# Patient Record
Sex: FEMALE | Race: WHITE | NOT HISPANIC OR LATINO | Employment: FULL TIME
[De-identification: names, ages, dates, MRNs, and addresses within clinical notes are randomized per-mention and may not be internally consistent; named-entity substitution may affect disease eponyms.]

---

## 2019-08-21 ENCOUNTER — RECORDS - HEALTHEAST (OUTPATIENT)
Dept: ADMINISTRATIVE | Facility: OTHER | Age: 34
End: 2019-08-21

## 2024-07-26 ENCOUNTER — TRANSFERRED RECORDS (OUTPATIENT)
Dept: HEALTH INFORMATION MANAGEMENT | Facility: CLINIC | Age: 39
End: 2024-07-26
Payer: COMMERCIAL

## 2024-07-26 ENCOUNTER — MEDICAL CORRESPONDENCE (OUTPATIENT)
Dept: HEALTH INFORMATION MANAGEMENT | Facility: CLINIC | Age: 39
End: 2024-07-26
Payer: COMMERCIAL

## 2024-09-04 ENCOUNTER — OFFICE VISIT (OUTPATIENT)
Dept: EDUCATION SERVICES | Facility: CLINIC | Age: 39
End: 2024-09-04
Payer: COMMERCIAL

## 2024-09-04 DIAGNOSIS — E66.9 OBESITY: Primary | ICD-10-CM

## 2024-09-04 PROCEDURE — 97802 MEDICAL NUTRITION INDIV IN: CPT | Performed by: DIETITIAN, REGISTERED

## 2024-09-04 PROCEDURE — 99207 PR DROP WITH A PROCEDURE: CPT | Performed by: DIETITIAN, REGISTERED

## 2024-09-04 NOTE — PATIENT INSTRUCTIONS
.  1500 calories           NUTRITION INTERVENTION:  Education   Weight loss  o Weight loss is the most important change you can make to reduce fat in the liver  o A 500 calorie deficit/day is recommended or a total weight loss of 7-10% of your body weight  o A healthy rate of weight loss is 1-2 pounds/week   Change your eating habits  o Avoid sugar and limit starchy foods (bread, pasta, rice, potatoes)  o Reduce your intake of saturated and trans fats   o Avoid high fructose corn syrup containing foods and beverages  o Avoid alcohol  o Increase your dietary fiber intake   Exercise more  o Moderate aerobic exercise for at least 20-30 minutes/day (i.e. brisk walking or stationary bike)  o Resistance or strength training at least 2-3 days/week  Diet Basics:   Eat 3-4 times daily. Do not go more than 3-4 hours without eating.   Consume whole foods: meat, vegetables, fruits, nuts, seeds, legumes, and whole grains.   Avoid sugar-sweetened beverages, added sugars, processed meats, refined grains, hydrogenated oils,   and other highly processed foods.   Never eat carbohydrate foods alone.    Include a balance of healthy fat, protein, and carbohydrate each time you eat        Limit carbohydrate intake to 2 servings (30 grams) per meal and 0-1 serving (<15 grams) at snacks   Examples of 1 carbohydrate serving (15 grams):  ? 1 slice bread  ?   large bagel  ? 1 tortilla (6 inch)  ?   hamburger or hotdog bun  ?   cup cooked cereal  ? 1/3 cup cooked rice and pasta  ?   cup dry cereal  ? 5-6 crackers  ?   cup starchy vegetable  ?   cup beans (also a protein source)  ? 1 cup or 1 small piece fruit  ? 2 tablespoons dried fruit  ?   cup fruit juice  ? 1 cup milk or soy milk  ?   cup unsweetened yogurt    Food recommended/ food not recommended list provided for each food group  Grocery shopping list provided    Meat & other protein sources  Try to plan at least two servings of fish each week. Also aim to fit three servings of lean  meat into your   meal plan every week.    Dairy & Dairy substitutes  Watch out for added sugars in yogurt. Choose unsweetened milk alternatives.    Grains  Look for  whole grain,   whole wheat,   sprouted grain,  and  high fiber  on package labels. Choose   foods with at least three grams of dietary fiber and fewer than eight grams of sugar per serving.    FATS AND OILS    1 serving per meal  o 1 tablespoon plant-based oil (olive, sunflower, avocado, etc.)  o 1 ounce nuts or seeds   o 1 tablespoon nut spread  o 2 tablespoons salad dressing  o 1 ounce cheese  o   avocado  o 1 tablespoon butter    NON-STARCHY VEGETABLES   Allowed in unlimited quantities - list provided     BEVERAGES   All beverages should be sugar free  o Good choices: water, coffee, tea, sparkling water (i.e. Pepito, Bubbly, etc.), Zevia (soda   sweetened with Stevia)  SWEETENERS   Limit to 1 tsp or less daily  o Good choices: Stevia, Monk fruit, Honey   Limit foods and beverages with ADDED sugar, especially those with high fructose corn syrup

## 2024-09-04 NOTE — PROGRESS NOTES
"Medical Nutrition Therapy  Visit Type:Initial assessment and intervention    Randi Lobo presents today for MNT and education related to weight management Body mass index is 36.56 kg/m . Obesity class 2.   She is accompanied by self.     ASSESSMENT:   Patient comments/concerns relating to nutrition: Patient would like to work on dietary and lifestyle interventions to help promote weight loss.  Driving a lot for work,    NUTRITION HISTORY:  Out to eat   Likes sugery foods candy, donuts   Intermittent fasting 12:12   Breakfast: 2 bagels plain with butter, coffee 1/2 tsp stevia, 1 tsp 1/2 and 1/2   Lunch:   Dinner: 3x/ week out to eat with client   Snacks: Cheese and banana, peanuts, trail mix nuts   Beverages: regular coke 3-4 a week, water    Previous diet education:  No     Food allergies/intolerances: No known food allergies/intolerances    Diet is high in: calories  Diet is low in: fiber    EXERCISE: no regular exercise program    MEDICATIONS:  No current outpatient medications on file.     No current facility-administered medications for this visit.       LABS:  No results found for: \"NA\"   No results found for: \"POTASSIUM\"  No results found for: \"CHLORIDE\"  No results found for: \"MARLEN\"  No results found for: \"CO2\"  No results found for: \"BUN\"  No results found for: \"CR\"  No results found for: \"GLC\"  No results found for: \"LDL\"  No results found for: \"HDL\"]  No results found for: \"GFRESTIMATED\"  No results found for: \"CR\"  No results found for: \"MICROALBUMIN\"    ANTHROPOMETRICS:  Body mass index is 36.56 kg/m .    Wt Readings from Last 10 Encounters:   09/04/24 90.7 kg (199 lb 14.4 oz)   03/05/14 68 kg (150 lb)       ESTIMATED KCAL REQUIREMENTS:  1500 kcal per day    NUTRITION DIAGNOSIS: Overweight/Obesity related to increased calorie intake as evidenced by BMI Body mass index is 36.56 kg/m .    .  1500 calories           NUTRITION INTERVENTION:  Education   Weight loss  o A 500 calorie deficit/day is " recommended or a total weight loss of 7-10% of your body weight  o A healthy rate of weight loss is 1-2 pounds/week   Change your eating habits  o Avoid sugar and limit starchy foods (bread, pasta, rice, potatoes)  o Reduce your intake of saturated and trans fats   o Avoid high fructose corn syrup containing foods and beverages  o Avoid alcohol  o Increase your dietary fiber intake   Exercise more  o Moderate aerobic exercise for at least 20-30 minutes/day (i.e. brisk walking or stationary bike)  o Resistance or strength training at least 2-3 days/week  Diet Basics:   Eat 3-4 times daily. Do not go more than 3-4 hours without eating.   Consume whole foods: meat, vegetables, fruits, nuts, seeds, legumes, and whole grains.   Avoid sugar-sweetened beverages, added sugars, processed meats, refined grains, hydrogenated oils,   and other highly processed foods.   Never eat carbohydrate foods alone.    Include a balance of healthy fat, protein, and carbohydrate each time you eat        Limit carbohydrate intake to 2 servings (30 grams) per meal and 0-1 serving (<15 grams) at snacks   Examples of 1 carbohydrate serving (15 grams):  ? 1 slice bread  ?   large bagel  ? 1 tortilla (6 inch)  ?   hamburger or hotdog bun  ?   cup cooked cereal  ? 1/3 cup cooked rice and pasta  ?   cup dry cereal  ? 5-6 crackers  ?   cup starchy vegetable  ?   cup beans (also a protein source)  ? 1 cup or 1 small piece fruit  ? 2 tablespoons dried fruit  ?   cup fruit juice  ? 1 cup milk or soy milk  ?   cup unsweetened yogurt    Food recommended/ food not recommended list provided for each food group  Grocery shopping list provided    Meat & other protein sources  Try to plan at least two servings of fish each week. Also aim to fit three servings of lean meat into your   meal plan every week.    Dairy & Dairy substitutes  Watch out for added sugars in yogurt. Choose unsweetened milk alternatives.    Grains  Look for  whole grain,   whole wheat,    sprouted grain,  and  high fiber  on package labels. Choose   foods with at least three grams of dietary fiber and fewer than eight grams of sugar per serving.    FATS AND OILS    1 serving per meal  o 1 tablespoon plant-based oil (olive, sunflower, avocado, etc.)  o 1 ounce nuts or seeds   o 1 tablespoon nut spread  o 2 tablespoons salad dressing  o 1 ounce cheese  o   avocado  o 1 tablespoon butter    NON-STARCHY VEGETABLES   Allowed in unlimited quantities - list provided     BEVERAGES   All beverages should be sugar free  o Good choices: water, coffee, tea, sparkling water (i.e. Pepito, Bubbly, etc.), Zevia (soda   sweetened with Stevia)  SWEETENERS   Limit to 1 tsp or less daily  o Good choices: Stevia, Monk fruit, Honey   Limit foods and beverages with ADDED sugar, especially those with high fructose corn syrup   PATIENT'S BEHAVIOR CHANGE GOALS:   See Patient Instructions for patient stated behavior change goals. AVS was printed and given to patient at today's appointment.    MONITOR / EVALUATE:  RD will monitor/evaluate:  Pertinent Labs  Weight change    FOLLOW-UP:  Follow up with RD as needed.  Time spent in minutes: 60  Encounter: Individual

## 2024-09-04 NOTE — LETTER
"    9/4/2024         RE: Randi Lobo  [address] [facility] [address]        Dear Colleague,    Thank you for referring your patient, Randi Lobo, to the [facility]. Please see a copy of my visit note below.    Medical Nutrition Therapy  Visit Type:Initial assessment and intervention    Randi Lobo presents today for MNT and education related to weight management Body mass index is 36.56 kg/m . Obesity class 2.   She is accompanied by self.     ASSESSMENT:   Patient comments/concerns relating to nutrition: Patient would like to work on dietary and lifestyle interventions to help promote weight loss.  Driving a lot for work,    NUTRITION HISTORY:  Out to eat   Likes sugery foods candy, donuts   Intermittent fasting 12:12   Breakfast: 2 bagels plain with butter, coffee 1/2 tsp stevia, 1 tsp 1/2 and 1/2   Lunch:   Dinner: 3x/ week out to eat with client   Snacks: Cheese and banana, peanuts, trail mix nuts   Beverages: regular coke 3-4 a week, water    Previous diet education:  No     Food allergies/intolerances: No known food allergies/intolerances    Diet is high in: calories  Diet is low in: fiber    EXERCISE: no regular exercise program    MEDICATIONS:  No current outpatient medications on file.     No current facility-administered medications for this visit.       LABS:  No results found for: \"NA\"   No results found for: \"POTASSIUM\"  No results found for: \"CHLORIDE\"  No results found for: \"MARLEN\"  No results found for: \"CO2\"  No results found for: \"BUN\"  No results found for: \"CR\"  No results found for: \"GLC\"  No results found for: \"LDL\"  No results found for: \"HDL\"]  No results found for: \"GFRESTIMATED\"  No results found for: \"CR\"  No results found for: \"MICROALBUMIN\"    ANTHROPOMETRICS:  Body mass index is 36.56 kg/m .    Wt Readings from Last 10 Encounters:   09/04/24 90.7 kg (199 lb 14.4 oz)   03/05/14 68 kg (150 lb)       ESTIMATED KCAL REQUIREMENTS:  1500 kcal per " day    NUTRITION DIAGNOSIS: Overweight/Obesity related to increased calorie intake as evidenced by BMI Body mass index is 36.56 kg/m .    .  1500 calories           NUTRITION INTERVENTION:  Education    Weight loss  o Weight loss is the most important change you can make to reduce fat in the liver  o A 500 calorie deficit/day is recommended or a total weight loss of 7-10% of your body weight  o A healthy rate of weight loss is 1-2 pounds/week    Change your eating habits  o Avoid sugar and limit starchy foods (bread, pasta, rice, potatoes)  o Reduce your intake of saturated and trans fats   o Avoid high fructose corn syrup containing foods and beverages  o Avoid alcohol  o Increase your dietary fiber intake    Exercise more  o Moderate aerobic exercise for at least 20-30 minutes/day (i.e. brisk walking or stationary bike)  o Resistance or strength training at least 2-3 days/week  Diet Basics:    Eat 3-4 times daily. Do not go more than 3-4 hours without eating.    Consume whole foods: meat, vegetables, fruits, nuts, seeds, legumes, and whole grains.    Avoid sugar-sweetened beverages, added sugars, processed meats, refined grains, hydrogenated oils,   and other highly processed foods.    Never eat carbohydrate foods alone.     Include a balance of healthy fat, protein, and carbohydrate each time you eat        Limit carbohydrate intake to 2 servings (30 grams) per meal and 0-1 serving (<15 grams) at snacks    Examples of 1 carbohydrate serving (15 grams):  ? 1 slice bread  ?   large bagel  ? 1 tortilla (6 inch)  ?   hamburger or hotdog bun  ?   cup cooked cereal  ? 1/3 cup cooked rice and pasta  ?   cup dry cereal  ? 5-6 crackers  ?   cup starchy vegetable  ?   cup beans (also a protein source)  ? 1 cup or 1 small piece fruit  ? 2 tablespoons dried fruit  ?   cup fruit juice  ? 1 cup milk or soy milk  ?   cup unsweetened yogurt    Food recommended/ food not recommended list provided for each food group  Grocery  shopping list provided    Meat & other protein sources  Try to plan at least two servings of fish each week. Also aim to fit three servings of lean meat into your   meal plan every week.    Dairy & Dairy substitutes  Watch out for added sugars in yogurt. Choose unsweetened milk alternatives.    Grains  Look for  whole grain,   whole wheat,   sprouted grain,  and  high fiber  on package labels. Choose   foods with at least three grams of dietary fiber and fewer than eight grams of sugar per serving.    FATS AND OILS     1 serving per meal  o 1 tablespoon plant-based oil (olive, sunflower, avocado, etc.)  o 1 ounce nuts or seeds   o 1 tablespoon nut spread  o 2 tablespoons salad dressing  o 1 ounce cheese  o   avocado  o 1 tablespoon butter    NON-STARCHY VEGETABLES    Allowed in unlimited quantities - list provided     BEVERAGES    All beverages should be sugar free  o Good choices: water, coffee, tea, sparkling water (i.e. Pepito, Bubbly, etc.), Zevia (soda   sweetened with Stevia)  SWEETENERS    Limit to 1 tsp or less daily  o Good choices: Stevia, Monk fruit, Honey    Limit foods and beverages with ADDED sugar, especially those with high fructose corn syrup   PATIENT'S BEHAVIOR CHANGE GOALS:   See Patient Instructions for patient stated behavior change goals. AVS was printed and given to patient at today's appointment.    MONITOR / EVALUATE:  RD will monitor/evaluate:  Pertinent Labs  Weight change    FOLLOW-UP:  Follow up with RD as needed.  Time spent in minutes: 60  Encounter: Individual

## 2024-09-08 VITALS — BODY MASS INDEX: 36.78 KG/M2 | WEIGHT: 199.9 LBS | HEIGHT: 62 IN

## 2024-09-28 ENCOUNTER — HEALTH MAINTENANCE LETTER (OUTPATIENT)
Age: 39
End: 2024-09-28

## 2024-10-16 ENCOUNTER — OFFICE VISIT (OUTPATIENT)
Dept: EDUCATION SERVICES | Facility: CLINIC | Age: 39
End: 2024-10-16
Payer: COMMERCIAL

## 2024-10-16 DIAGNOSIS — E66.9 OBESITY: Primary | ICD-10-CM

## 2024-10-16 PROCEDURE — 97803 MED NUTRITION INDIV SUBSEQ: CPT | Performed by: DIETITIAN, REGISTERED

## 2024-10-16 NOTE — LETTER
"    10/16/2024         RE: Randi Lobo  [address] [facility] [address]        Dear Colleague,    Thank you for referring your patient, Randi Lobo, to the [facility]. Please see a copy of my visit note below.    Medical Nutrition Therapy  Visit Type: Follow up assessment and intervention    Randi Lobo presents today for MNT and education related to weight management Body mass index is 35.32 kg/m . Obesity class 2.   She is accompanied by self.     ASSESSMENT:   9/4/2024  Patient comments/concerns relating to nutrition: Patient would like to work on dietary and lifestyle interventions to help promote weight loss.  Driving a lot for work  10/16/2024 Pt is motivated with changes since last consult.  Pt is tracking intake and scanning foods.  Pt also started using OWYN protein shake as a meal replacement drink.  Pt is exercising 30 - 45 min on most days.    NUTRITION HISTORY:  Had been 3-4 regular soda per week now down to 1 per week.  Reduced sweets.    Working on getting more fruit daily, still working on increasing vegetables     Previous diet education:  yes     Food allergies/intolerances: No known food allergies/intolerances    Diet is high in: calories  Diet is low in: fiber    EXERCISE: improving 30 - 45 min    MEDICATIONS:  No current outpatient medications on file.     No current facility-administered medications for this visit.       LABS:  No results found for: \"NA\"   No results found for: \"POTASSIUM\"  No results found for: \"CHLORIDE\"  No results found for: \"MARLEN\"  No results found for: \"CO2\"  No results found for: \"BUN\"  No results found for: \"CR\"  No results found for: \"GLC\"  No results found for: \"LDL\"  No results found for: \"HDL\"]  No results found for: \"GFRESTIMATED\"  No results found for: \"CR\"  No results found for: \"MICROALBUMIN\"    ANTHROPOMETRICS:  Body mass index is 35.32 kg/m .    Wt Readings from Last 10 Encounters:   10/16/24 87.6 kg (193 lb 1.6 oz) "   09/04/24 90.7 kg (199 lb 14.4 oz)   03/05/14 68 kg (150 lb)       ESTIMATED KCAL REQUIREMENTS:  1500 kcal per day    NUTRITION DIAGNOSIS: Overweight/Obesity related to increased calorie intake as evidenced by BMI Body mass index is 35.32 kg/m .    Calorie recommendations 1500 calories           NUTRITION INTERVENTION:  Education review of previous education and ongoing motivational counseling.  Discussed what is working well and areas of improvement     Weight loss  o A 500 calorie deficit/day is recommended or a total weight loss of 7-10% of your body weight  o A healthy rate of weight loss is 1-2 pounds/week    Change your eating habits  o Avoid sugar and limit starchy foods (bread, pasta, rice, potatoes)  o Reduce your intake of saturated and trans fats   o Avoid high fructose corn syrup containing foods and beverages  o Avoid alcohol  o Increase your dietary fiber intake    Exercise more  o Moderate aerobic exercise for at least 20-30 minutes/day (i.e. brisk walking or stationary bike)  o Resistance or strength training at least 2-3 days/week  Diet Basics:    Eat 3-4 times daily. Do not go more than 3-4 hours without eating.    Consume whole foods: meat, vegetables, fruits, nuts, seeds, legumes, and whole grains.    Avoid sugar-sweetened beverages, added sugars, processed meats, refined grains, hydrogenated oils,   and other highly processed foods.    Never eat carbohydrate foods alone.     Include a balance of healthy fat, protein, and carbohydrate each time you eat        Limit carbohydrate intake to 2 servings (30 grams) per meal and 0-1 serving (<15 grams) at snacks    Examples of 1 carbohydrate serving (15 grams):  ? 1 slice bread  ?   large bagel  ? 1 tortilla (6 inch)  ?   hamburger or hotdog bun  ?   cup cooked cereal  ? 1/3 cup cooked rice and pasta  ?   cup dry cereal  ? 5-6 crackers  ?   cup starchy vegetable  ?   cup beans (also a protein source)  ? 1 cup or 1 small piece fruit  ? 2 tablespoons  dried fruit  ?   cup fruit juice  ? 1 cup milk or soy milk  ?   cup unsweetened yogurt    Food recommended/ food not recommended list provided for each food group  Grocery shopping list provided    Meat & other protein sources  Try to plan at least two servings of fish each week. Also aim to fit three servings of lean meat into your   meal plan every week.    Dairy & Dairy substitutes  Watch out for added sugars in yogurt. Choose unsweetened milk alternatives.    Grains  Look for  whole grain,   whole wheat,   sprouted grain,  and  high fiber  on package labels. Choose   foods with at least three grams of dietary fiber and fewer than eight grams of sugar per serving.    FATS AND OILS     1 serving per meal  o 1 tablespoon plant-based oil (olive, sunflower, avocado, etc.)  o 1 ounce nuts or seeds   o 1 tablespoon nut spread  o 2 tablespoons salad dressing  o 1 ounce cheese  o   avocado  o 1 tablespoon butter    NON-STARCHY VEGETABLES    Allowed in unlimited quantities - list provided     BEVERAGES    All beverages should be sugar free  o Good choices: water, coffee, tea, sparkling water (i.e. Pepito, Bubbly, etc.), Zevia (soda   sweetened with Stevia)  SWEETENERS    Limit to 1 tsp or less daily  o Good choices: Stevia, Monk fruit, Honey    Limit foods and beverages with ADDED sugar, especially those with high fructose corn syrup   PATIENT'S BEHAVIOR CHANGE GOALS:   See Patient Instructions for patient stated behavior change goals. AVS was printed and given to patient at today's appointment.    MONITOR / EVALUATE:  RD will monitor/evaluate:  Pertinent Labs  Weight change    FOLLOW-UP:  Follow up with RD as needed.  Time spent in minutes: 45  Encounter: Individual

## 2024-10-16 NOTE — PATIENT INSTRUCTIONS
Sugar snap peas, peppers, broccoli - hummus      NUTRITION INTERVENTION:  Education   Weight loss  o A 500 calorie deficit/day is recommended or a total weight loss of 7-10% of your body weight  o A healthy rate of weight loss is 1-2 pounds/week   Change your eating habits  o Avoid sugar and limit starchy foods (bread, pasta, rice, potatoes)  o Reduce your intake of saturated and trans fats   o Avoid high fructose corn syrup containing foods and beverages  o Avoid alcohol  o Increase your dietary fiber intake   Exercise more  o Moderate aerobic exercise for at least 20-30 minutes/day (i.e. brisk walking or stationary bike)  o Resistance or strength training at least 2-3 days/week  Diet Basics:   Eat 3-4 times daily. Do not go more than 3-4 hours without eating.   Consume whole foods: meat, vegetables, fruits, nuts, seeds, legumes, and whole grains.   Avoid sugar-sweetened beverages, added sugars, processed meats, refined grains, hydrogenated oils,   and other highly processed foods.   Never eat carbohydrate foods alone.    Include a balance of healthy fat, protein, and carbohydrate each time you eat          Limit carbohydrate intake to 2 servings (30 grams) per meal and 0-1 serving (<15 grams) at snacks   Examples of 1 carbohydrate serving (15 grams):  ? 1 slice bread  ?   large bagel  ? 1 tortilla (6 inch)  ?   hamburger or hotdog bun  ?   cup cooked cereal  ? 1/3 cup cooked rice and pasta  ?   cup dry cereal  ? 5-6 crackers  ?   cup starchy vegetable  ?   cup beans (also a protein source)  ? 1 cup or 1 small piece fruit  ? 2 tablespoons dried fruit  ?   cup fruit juice  ? 1 cup milk or soy milk  ?   cup unsweetened yogurt     Food recommended/ food not recommended list provided for each food group  Grocery shopping list provided     Meat & other protein sources  Try to plan at least two servings of fish each week. Also aim to fit three servings of lean meat into your   meal plan every week.     Dairy & Dairy  substitutes  Watch out for added sugars in yogurt. Choose unsweetened milk alternatives.     Grains  Look for  whole grain,   whole wheat,   sprouted grain,  and  high fiber  on package labels. Choose   foods with at least three grams of dietary fiber and fewer than eight grams of sugar per serving.     FATS AND OILS    1 serving per meal  o 1 tablespoon plant-based oil (olive, sunflower, avocado, etc.)  o 1 ounce nuts or seeds   o 1 tablespoon nut spread  o 2 tablespoons salad dressing  o 1 ounce cheese  o   avocado  o 1 tablespoon butter     NON-STARCHY VEGETABLES   Allowed in unlimited quantities - list provided      BEVERAGES   All beverages should be sugar free  o Good choices: water, coffee, tea, sparkling water (i.e. Pepito, Bubbly, etc.), Zevia (soda   sweetened with Stevia)  SWEETENERS   Limit to 1 tsp or less daily  o Good choices: Stevia, Monk fruit, Honey   Limit foods and beverages with ADDED sugar, especially those with high fructose corn syrup

## 2024-10-20 VITALS — WEIGHT: 193.1 LBS | BODY MASS INDEX: 35.53 KG/M2 | HEIGHT: 62 IN

## 2024-10-20 NOTE — PROGRESS NOTES
"Medical Nutrition Therapy  Visit Type: Follow up assessment and intervention    Randi Lobo presents today for MNT and education related to weight management Body mass index is 35.32 kg/m . Obesity class 2.   She is accompanied by self.     ASSESSMENT:   9/4/2024  Patient comments/concerns relating to nutrition: Patient would like to work on dietary and lifestyle interventions to help promote weight loss.  Driving a lot for work  10/16/2024 Pt is motivated with changes since last consult.  Pt is tracking intake and scanning foods.  Pt also started using OWYN protein shake as a meal replacement drink.  Pt is exercising 30 - 45 min on most days.    NUTRITION HISTORY:  Had been 3-4 regular soda per week now down to 1 per week.  Reduced sweets.    Working on getting more fruit daily, still working on increasing vegetables     Previous diet education:  yes     Food allergies/intolerances: No known food allergies/intolerances    Diet is high in: calories  Diet is low in: fiber    EXERCISE: improving 30 - 45 min    MEDICATIONS:  No current outpatient medications on file.     No current facility-administered medications for this visit.       LABS:  No results found for: \"NA\"   No results found for: \"POTASSIUM\"  No results found for: \"CHLORIDE\"  No results found for: \"MARLEN\"  No results found for: \"CO2\"  No results found for: \"BUN\"  No results found for: \"CR\"  No results found for: \"GLC\"  No results found for: \"LDL\"  No results found for: \"HDL\"]  No results found for: \"GFRESTIMATED\"  No results found for: \"CR\"  No results found for: \"MICROALBUMIN\"    ANTHROPOMETRICS:  Body mass index is 35.32 kg/m .    Wt Readings from Last 10 Encounters:   10/16/24 87.6 kg (193 lb 1.6 oz)   09/04/24 90.7 kg (199 lb 14.4 oz)   03/05/14 68 kg (150 lb)       ESTIMATED KCAL REQUIREMENTS:  1500 kcal per day    NUTRITION DIAGNOSIS: Overweight/Obesity related to increased calorie intake as evidenced by BMI Body mass index is 35.32 " kg/m .    Calorie recommendations 1500 calories           NUTRITION INTERVENTION:  Education review of previous education and ongoing motivational counseling.  Discussed what is working well and areas of improvement    Weight loss  o A 500 calorie deficit/day is recommended or a total weight loss of 7-10% of your body weight  o A healthy rate of weight loss is 1-2 pounds/week   Change your eating habits  o Avoid sugar and limit starchy foods (bread, pasta, rice, potatoes)  o Reduce your intake of saturated and trans fats   o Avoid high fructose corn syrup containing foods and beverages  o Avoid alcohol  o Increase your dietary fiber intake   Exercise more  o Moderate aerobic exercise for at least 20-30 minutes/day (i.e. brisk walking or stationary bike)  o Resistance or strength training at least 2-3 days/week  Diet Basics:   Eat 3-4 times daily. Do not go more than 3-4 hours without eating.   Consume whole foods: meat, vegetables, fruits, nuts, seeds, legumes, and whole grains.   Avoid sugar-sweetened beverages, added sugars, processed meats, refined grains, hydrogenated oils,   and other highly processed foods.   Never eat carbohydrate foods alone.    Include a balance of healthy fat, protein, and carbohydrate each time you eat        Limit carbohydrate intake to 2 servings (30 grams) per meal and 0-1 serving (<15 grams) at snacks   Examples of 1 carbohydrate serving (15 grams):  ? 1 slice bread  ?   large bagel  ? 1 tortilla (6 inch)  ?   hamburger or hotdog bun  ?   cup cooked cereal  ? 1/3 cup cooked rice and pasta  ?   cup dry cereal  ? 5-6 crackers  ?   cup starchy vegetable  ?   cup beans (also a protein source)  ? 1 cup or 1 small piece fruit  ? 2 tablespoons dried fruit  ?   cup fruit juice  ? 1 cup milk or soy milk  ?   cup unsweetened yogurt    Food recommended/ food not recommended list provided for each food group  Grocery shopping list provided    Meat & other protein sources  Try to plan at least two  servings of fish each week. Also aim to fit three servings of lean meat into your   meal plan every week.    Dairy & Dairy substitutes  Watch out for added sugars in yogurt. Choose unsweetened milk alternatives.    Grains  Look for  whole grain,   whole wheat,   sprouted grain,  and  high fiber  on package labels. Choose   foods with at least three grams of dietary fiber and fewer than eight grams of sugar per serving.    FATS AND OILS    1 serving per meal  o 1 tablespoon plant-based oil (olive, sunflower, avocado, etc.)  o 1 ounce nuts or seeds   o 1 tablespoon nut spread  o 2 tablespoons salad dressing  o 1 ounce cheese  o   avocado  o 1 tablespoon butter    NON-STARCHY VEGETABLES   Allowed in unlimited quantities - list provided     BEVERAGES   All beverages should be sugar free  o Good choices: water, coffee, tea, sparkling water (i.e. Pepito, Bubbly, etc.), Zevia (soda   sweetened with Stevia)  SWEETENERS   Limit to 1 tsp or less daily  o Good choices: Stevia, Monk fruit, Honey   Limit foods and beverages with ADDED sugar, especially those with high fructose corn syrup   PATIENT'S BEHAVIOR CHANGE GOALS:   See Patient Instructions for patient stated behavior change goals. AVS was printed and given to patient at today's appointment.    MONITOR / EVALUATE:  RD will monitor/evaluate:  Pertinent Labs  Weight change    FOLLOW-UP:  Follow up with RD as needed.  Time spent in minutes: 45  Encounter: Individual

## 2025-01-15 ENCOUNTER — OFFICE VISIT (OUTPATIENT)
Dept: EDUCATION SERVICES | Facility: CLINIC | Age: 40
End: 2025-01-15
Payer: COMMERCIAL

## 2025-01-15 DIAGNOSIS — E66.9 OBESITY: Primary | ICD-10-CM

## 2025-01-15 PROCEDURE — 97803 MED NUTRITION INDIV SUBSEQ: CPT | Performed by: DIETITIAN, REGISTERED

## 2025-01-15 NOTE — PATIENT INSTRUCTIONS
A 500 calorie deficit/day is recommended or a total weight loss of 7-10% of your body weight  o A healthy rate of weight loss is 1-2 pounds/week   Change your eating habits  o Avoid sugar and limit starchy foods (bread, pasta, rice, potatoes)  o Reduce your intake of saturated and trans fats   o Avoid high fructose corn syrup containing foods and beverages  o Avoid alcohol  o Increase your dietary fiber intake   Exercise more  o Moderate aerobic exercise for at least 20-30 minutes/day (i.e. brisk walking or stationary bike)  o Resistance or strength training at least 2-3 days/week  Diet Basics:   Eat 3-4 times daily. Do not go more than 3-4 hours without eating.   Consume whole foods: meat, vegetables, fruits, nuts, seeds, legumes, and whole grains.   Avoid sugar-sweetened beverages, added sugars, processed meats, refined grains, hydrogenated oils,   and other highly processed foods.   Never eat carbohydrate foods alone.    Include a balance of healthy fat, protein, and carbohydrate each time you eat          Limit carbohydrate intake to 2 servings (30 grams) per meal and 0-1 serving (<15 grams) at snacks   Examples of 1 carbohydrate serving (15 grams):  ? 1 slice bread  ?   large bagel  ? 1 tortilla (6 inch)  ?   hamburger or hotdog bun  ?   cup cooked cereal  ? 1/3 cup cooked rice and pasta  ?   cup dry cereal  ? 5-6 crackers  ?   cup starchy vegetable  ?   cup beans (also a protein source)  ? 1 cup or 1 small piece fruit  ? 2 tablespoons dried fruit  ?   cup fruit juice  ? 1 cup milk or soy milk  ?   cup unsweetened yogurt     Food recommended/ food not recommended list provided for each food group  Grocery shopping list provided     Meat & other protein sources  Try to plan at least two servings of fish each week. Also aim to fit three servings of lean meat into your   meal plan every week.     Dairy & Dairy substitutes  Watch out for added sugars in yogurt. Choose unsweetened milk alternatives.      Grains  Look for  whole grain,   whole wheat,   sprouted grain,  and  high fiber  on package labels. Choose   foods with at least three grams of dietary fiber and fewer than eight grams of sugar per serving.     FATS AND OILS    1 serving per meal  o 1 tablespoon plant-based oil (olive, sunflower, avocado, etc.)  o 1 ounce nuts or seeds   o 1 tablespoon nut spread  o 2 tablespoons salad dressing  o 1 ounce cheese  o   avocado  o 1 tablespoon butter     NON-STARCHY VEGETABLES   Allowed in unlimited quantities - list provided      BEVERAGES   All beverages should be sugar free  o Good choices: water, coffee, tea, sparkling water (i.e. Pepito, Bubbly, etc.), Zevia (soda   sweetened with Stevia)  SWEETENERS   Limit to 1 tsp or less daily  o Good choices: Stevia, Monk fruit, Honey   Limit foods and beverages with ADDED sugar, especially those with high fructose corn syrup

## 2025-01-15 NOTE — LETTER
1/15/2025         [facility]: [facility]  [address] [facility] [facility] [address]        Dear Colleague,    Thank you for referring your patient, Randi Lobo, to the [facility]. Please see a copy of my visit note below.    Medical Nutrition Therapy  Visit Type: Follow up assessment and intervention    Randi Lobo presents today for MNT and education related to weight management Body mass index is 34 kg/m . Obesity class 2.   She is accompanied by self.     ASSESSMENT:   9/4/2024  Patient comments/concerns relating to nutrition: Patient would like to work on dietary and lifestyle interventions to help promote weight loss.  Driving a lot for work  10/16/2024 Pt is motivated with changes since last consult.  Pt is tracking intake and scanning foods.  Pt also started using OWYN protein shake as a meal replacement drink.  Pt is exercising 30 - 45 min on most days.    NUTRITION HISTORY:  Had been 3-4 regular soda per week now down to 1 per week.  Reduced sweets.    Working on getting more fruit daily, still working on increasing vegetables     Previous diet education:  yes     Food allergies/intolerances: No known food allergies/intolerances    Diet is high in: calories  Diet is low in: fiber    EXERCISE: improving 30 - 45 min    MEDICATIONS:  No current outpatient medications on file.     No current facility-administered medications for this visit.       LABS:    No labs to review in care everywhere    ANTHROPOMETRICS:  Body mass index is 34 kg/m .    Wt Readings from Last 10 Encounters:   01/15/25 84.3 kg (185 lb 14.4 oz)   10/16/24 87.6 kg (193 lb 1.6 oz)   09/04/24 90.7 kg (199 lb 14.4 oz)   03/05/14 68 kg (150 lb)     1/15/2025  39.3% body fat  09/4/2024  41.5% body fat      ESTIMATED KCAL REQUIREMENTS:  1500 kcal per day    NUTRITION DIAGNOSIS: Overweight/Obesity related to increased calorie intake as evidenced by BMI Body mass index is 34 kg/m .    Calorie recommendations 1500  calories (pt going down to 1250)      NUTRITION INTERVENTION:  Education review of previous education and ongoing motivational counseling.  Discussed what is working well and areas of improvement.    Pt using Aloha or Gomacros bars and not as often OWYN protein drinks  Patients current dietary supplements include: Probiotics, zinc, magnesium, iron, Red macca root - reviewed safety and efficacy  Pt continues with intermittent fasting 13 fast:11  Pt now using foot peddler for exercise (improvement)    Weight loss  o A 500 calorie deficit/day is recommended or a total weight loss of 7-10% of your body weight  o A healthy rate of weight loss is 1-2 pounds/week    Change your eating habits  o Avoid sugar and limit starchy foods (bread, pasta, rice, potatoes)  o Reduce your intake of saturated and trans fats   o Avoid high fructose corn syrup containing foods and beverages  o Avoid alcohol  o Increase your dietary fiber intake    Exercise more  o Moderate aerobic exercise for at least 20-30 minutes/day (i.e. brisk walking or stationary bike)  o Resistance or strength training at least 2-3 days/week  Diet Basics:    Eat 3-4 times daily.     Consume whole foods: meat, vegetables, fruits, nuts, seeds, legumes, and whole grains.    Avoid sugar-sweetened beverages, added sugars, processed meats, refined grains, hydrogenated oils,   and other highly processed foods.    Include a balance of healthy fat, protein, and carbohydrate each time you eat        Limit carbohydrate intake to 2 servings (30 grams) per meal and 0-1 serving (<15 grams) at snacks    Examples of 1 carbohydrate serving (15 grams):  ? 1 slice bread  ?   large bagel  ? 1 tortilla (6 inch)  ?   hamburger or hotdog bun  ?   cup cooked cereal  ? 1/3 cup cooked rice and pasta  ?   cup dry cereal  ? 5-6 crackers  ?   cup starchy vegetable  ?   cup beans (also a protein source)  ? 1 cup or 1 small piece fruit  ? 2 tablespoons dried fruit  ?   cup fruit juice  ? 1 cup  milk or soy milk  ?   cup unsweetened yogurt    Food recommended/ food not recommended list provided for each food group  Grocery shopping list provided    Meat & other protein sources  Try to plan at least two servings of fish each week. Also aim to fit three servings of lean meat into your   meal plan every week.    Dairy & Dairy substitutes  Watch out for added sugars in yogurt. Choose unsweetened milk alternatives.    Grains  Look for  whole grain,   whole wheat,   sprouted grain,  and  high fiber  on package labels. Choose   foods with at least three grams of dietary fiber and fewer than eight grams of sugar per serving.    FATS AND OILS     1 serving per meal  o 1 tablespoon plant-based oil (olive, sunflower, avocado, etc.)  o 1 ounce nuts or seeds   o 1 tablespoon nut spread  o 2 tablespoons salad dressing  o 1 ounce cheese  o   avocado  o 1 tablespoon butter    NON-STARCHY VEGETABLES    Allowed in unlimited quantities - list provided - encouraged to continue to increase intake     BEVERAGES    All beverages should be sugar free (**Pt to eliminate/ significantly reduce regular soda - currently consuming 1 can regular soda per day)  o Good choices: water, coffee, tea, sparkling water (i.e. Pepito, Bubbly, etc.), Zevia (soda   sweetened with Stevia)  SWEETENERS    Limit to 1 tsp or less daily  o Good choices: Stevia, Monk fruit, Honey    Limit foods and beverages with ADDED sugar, especially those with high fructose corn syrup   PATIENT'S BEHAVIOR CHANGE GOALS:   See Patient Instructions for patient stated behavior change goals. AVS was printed and given to patient at today's appointment.    MONITOR / EVALUATE:  RD will monitor/evaluate:  Pertinent Labs  Weight change    FOLLOW-UP:  Follow up with RD as needed.  Time spent in minutes: 30  Encounter: Individual

## 2025-01-18 VITALS — HEIGHT: 62 IN | WEIGHT: 185.9 LBS | BODY MASS INDEX: 34.21 KG/M2

## 2025-01-18 NOTE — PROGRESS NOTES
Medical Nutrition Therapy  Visit Type: Follow up assessment and intervention    Randi Lobo presents today for MNT and education related to weight management Body mass index is 34 kg/m . Obesity class 2.   She is accompanied by self.     ASSESSMENT:   9/4/2024  Patient comments/concerns relating to nutrition: Patient would like to work on dietary and lifestyle interventions to help promote weight loss.  Driving a lot for work  10/16/2024 Pt is motivated with changes since last consult.  Pt is tracking intake and scanning foods.  Pt also started using OWYN protein shake as a meal replacement drink.  Pt is exercising 30 - 45 min on most days.    NUTRITION HISTORY:  Had been 3-4 regular soda per week now down to 1 per week.  Reduced sweets.    Working on getting more fruit daily, still working on increasing vegetables     Previous diet education:  yes     Food allergies/intolerances: No known food allergies/intolerances    Diet is high in: calories  Diet is low in: fiber    EXERCISE: improving 30 - 45 min    MEDICATIONS:  No current outpatient medications on file.     No current facility-administered medications for this visit.       LABS:    No labs to review in care everywhere    ANTHROPOMETRICS:  Body mass index is 34 kg/m .    Wt Readings from Last 10 Encounters:   01/15/25 84.3 kg (185 lb 14.4 oz)   10/16/24 87.6 kg (193 lb 1.6 oz)   09/04/24 90.7 kg (199 lb 14.4 oz)   03/05/14 68 kg (150 lb)     1/15/2025  39.3% body fat  09/4/2024  41.5% body fat      ESTIMATED KCAL REQUIREMENTS:  1500 kcal per day    NUTRITION DIAGNOSIS: Overweight/Obesity related to increased calorie intake as evidenced by BMI Body mass index is 34 kg/m .    Calorie recommendations 1500 calories (pt going down to 1250)      NUTRITION INTERVENTION:  Education review of previous education and ongoing motivational counseling.  Discussed what is working well and areas of improvement.    Pt using Aloha or Gomacros bars and not as often OWYN  protein drinks  Patients current dietary supplements include: Probiotics, zinc, magnesium, iron, Red macca root - reviewed safety and efficacy  Pt continues with intermittent fasting 13 fast:11  Pt now using foot peddler for exercise (improvement)   Weight loss  o A 500 calorie deficit/day is recommended or a total weight loss of 7-10% of your body weight  o A healthy rate of weight loss is 1-2 pounds/week   Change your eating habits  o Avoid sugar and limit starchy foods (bread, pasta, rice, potatoes)  o Reduce your intake of saturated and trans fats   o Avoid high fructose corn syrup containing foods and beverages  o Avoid alcohol  o Increase your dietary fiber intake   Exercise more  o Moderate aerobic exercise for at least 20-30 minutes/day (i.e. brisk walking or stationary bike)  o Resistance or strength training at least 2-3 days/week  Diet Basics:   Eat 3-4 times daily.    Consume whole foods: meat, vegetables, fruits, nuts, seeds, legumes, and whole grains.   Avoid sugar-sweetened beverages, added sugars, processed meats, refined grains, hydrogenated oils,   and other highly processed foods.   Include a balance of healthy fat, protein, and carbohydrate each time you eat        Limit carbohydrate intake to 2 servings (30 grams) per meal and 0-1 serving (<15 grams) at snacks   Examples of 1 carbohydrate serving (15 grams):  ? 1 slice bread  ?   large bagel  ? 1 tortilla (6 inch)  ?   hamburger or hotdog bun  ?   cup cooked cereal  ? 1/3 cup cooked rice and pasta  ?   cup dry cereal  ? 5-6 crackers  ?   cup starchy vegetable  ?   cup beans (also a protein source)  ? 1 cup or 1 small piece fruit  ? 2 tablespoons dried fruit  ?   cup fruit juice  ? 1 cup milk or soy milk  ?   cup unsweetened yogurt    Food recommended/ food not recommended list provided for each food group  Grocery shopping list provided    Meat & other protein sources  Try to plan at least two servings of fish each week. Also aim to fit three  servings of lean meat into your   meal plan every week.    Dairy & Dairy substitutes  Watch out for added sugars in yogurt. Choose unsweetened milk alternatives.    Grains  Look for  whole grain,   whole wheat,   sprouted grain,  and  high fiber  on package labels. Choose   foods with at least three grams of dietary fiber and fewer than eight grams of sugar per serving.    FATS AND OILS    1 serving per meal  o 1 tablespoon plant-based oil (olive, sunflower, avocado, etc.)  o 1 ounce nuts or seeds   o 1 tablespoon nut spread  o 2 tablespoons salad dressing  o 1 ounce cheese  o   avocado  o 1 tablespoon butter    NON-STARCHY VEGETABLES   Allowed in unlimited quantities - list provided - encouraged to continue to increase intake     BEVERAGES   All beverages should be sugar free (**Pt to eliminate/ significantly reduce regular soda - currently consuming 1 can regular soda per day)  o Good choices: water, coffee, tea, sparkling water (i.e. Pepito, Bubbly, etc.), Zevia (soda   sweetened with Stevia)  SWEETENERS   Limit to 1 tsp or less daily  o Good choices: Stevia, Monk fruit, Honey   Limit foods and beverages with ADDED sugar, especially those with high fructose corn syrup   PATIENT'S BEHAVIOR CHANGE GOALS:   See Patient Instructions for patient stated behavior change goals. AVS was printed and given to patient at today's appointment.    MONITOR / EVALUATE:  RD will monitor/evaluate:  Pertinent Labs  Weight change    FOLLOW-UP:  Follow up with RD as needed.  Time spent in minutes: 30  Encounter: Individual